# Patient Record
(demographics unavailable — no encounter records)

---

## 2025-05-16 NOTE — PHYSICAL EXAM
[Normal Heart Sounds] : normal heart sounds [2+] : left 2+ [Ankle Swelling (On Exam)] : not present [Abdomen Masses] : No abdominal masses

## 2025-05-16 NOTE — CONSULT LETTER
[Dear  ___] : Dear  [unfilled], [Consult Letter:] : I had the pleasure of evaluating your patient, [unfilled]. [Please see my note below.] : Please see my note below. [Consult Closing:] : Thank you very much for allowing me to participate in the care of this patient.  If you have any questions, please do not hesitate to contact me. [Sincerely,] : Sincerely, [FreeTextEntry3] : Demarcus Vance M.D., F.CHAITANYA., R.P.GAETANOI.  of Vascular Surgery Assistant Professor of Radiology Director of Endovascular Program/ [facility]

## 2025-05-16 NOTE — ASSESSMENT
[FreeTextEntry1] : Patient is a 75-year-old with history of diabetes and coronary artery disease presenting to us for evaluation of lower extremity cramping and coldness of feet.  Based on clinical examination including strongly palpable posterior tibial pulses and arterial Dopplers, there is no evidence of significant arterial insufficiency.  I suspect the coldness of feet is related to autonomic neuropathy secondary to diabetes and therefore recommend neurology evaluation.  The cramping should be managed conservatively also.  No vascular intervention or further workup is required.  Continue Eliquis.

## 2025-05-16 NOTE — HISTORY OF PRESENT ILLNESS
[FreeTextEntry1] : Patient is a 75-year-old gentleman with past medical history significant for diabetes, hypertension, coronary artery disease status post coronary stent placement in 2013 with negative recent stress test, history of atrial fibrillation on Eliquis, former smoker presenting to us for evaluation of bilateral lower extremity cramping and coolness of feet specially at night, left side worse than right.  Patient denies any history of claudication or rest pain.  Patient denies any history of tissue loss.  The cramps happen mostly at night around the calf area and this happens about 2 or 3 times a week.